# Patient Record
Sex: MALE | Race: WHITE | URBAN - METROPOLITAN AREA
[De-identification: names, ages, dates, MRNs, and addresses within clinical notes are randomized per-mention and may not be internally consistent; named-entity substitution may affect disease eponyms.]

---

## 2019-04-01 ENCOUNTER — OFFICE VISIT (OUTPATIENT)
Dept: UROLOGY | Age: 29
End: 2019-04-01
Payer: COMMERCIAL

## 2019-04-01 VITALS
SYSTOLIC BLOOD PRESSURE: 136 MMHG | HEIGHT: 70 IN | DIASTOLIC BLOOD PRESSURE: 88 MMHG | TEMPERATURE: 98.4 F | WEIGHT: 274 LBS | BODY MASS INDEX: 39.22 KG/M2

## 2019-04-01 DIAGNOSIS — R68.82 DECREASED LIBIDO: ICD-10-CM

## 2019-04-01 DIAGNOSIS — R39.89 BLADDER PAIN: ICD-10-CM

## 2019-04-01 DIAGNOSIS — N50.819 TESTICULAR PAIN: Primary | ICD-10-CM

## 2019-04-01 PROCEDURE — 99204 OFFICE O/P NEW MOD 45 MIN: CPT | Performed by: UROLOGY

## 2019-04-01 SDOH — HEALTH STABILITY: MENTAL HEALTH: HOW OFTEN DO YOU HAVE A DRINK CONTAINING ALCOHOL?: 2-3 TIMES A WEEK

## 2019-04-01 SDOH — HEALTH STABILITY: MENTAL HEALTH: HOW MANY STANDARD DRINKS CONTAINING ALCOHOL DO YOU HAVE ON A TYPICAL DAY?: 1 OR 2

## 2019-04-01 ASSESSMENT — ENCOUNTER SYMPTOMS
BACK PAIN: 0
VOMITING: 0
ABDOMINAL PAIN: 0
WHEEZING: 0
COLOR CHANGE: 0
EYE REDNESS: 0
SHORTNESS OF BREATH: 0
NAUSEA: 0
EYE PAIN: 0
COUGH: 0

## 2019-04-01 NOTE — PROGRESS NOTES
[facility]  [facility] [place]  [address]  [address]  [address] [facility] [address]  Dept: [phone]  Dept Fax: [phone]    [facility] Urology Office Note - New patient    Patient:  Keely Hastings  YOB: 1990  Date: 4/1/2019    The patient is a 25 y.o. male who presents todayfor evaluation of the following problems:   Chief Complaint   Patient presents with    New Patient     testicle pain , sensation of pressure in the bladder after urination , decreased libido , culture and us in care everywhere     referred by EILEEN Sin CNP. HPI  Pt has pelvic pressure; persists after he urinates. Has mild right testicular pain. startyed a few months ago. Has remote history of hydrocelectomy as young kid. Had u/s which was normal.      Has decreased libido. Has mild ED. Libido improving. Has to work harder to achieve erections but is able maintain. Able to penetrate and ejaculate normally. (Patient's old records have been requested, reviewed and summarized in today's note.)    Summary of old records: N/A    Additional History: N/A    Procedures Today: N/A    Last several PSA's:  No results found for: PSA  Last total testosterone:  No results found for: TESTOSTERONE  Urinalysis today:  No results found for this visit on 04/01/19.     AUA Symptom Score (4/1/2019):  INCOMPLETE EMPTYING: How often have you had the sensation of not emptying your bladder?: Not at all  FREQUENCY: How often do you have to urinate less than every two hours?: Not at all  INTERMITTENCY: How often have you found you stopped and started again several times when you urinated?: Not at all  URGENCY: How often have you found it difficult to postpone urination?: Less than 1 to 5 times  WEAK STREAM: How often have you had a weak urinary stream?: Not at all  STRAINING: How often have you had to strain to start  urination?: Not at all  NOCTURIA: How many times did you typically get up at not distended. Musculoskeletal: Normal gait and station  Penis normal and circumcised  Urethral meatus normal  Scrotal exam normal  Testicles normal bilaterally  Epididymis normal bilaterally        Assessment and Plan      1. Testicular pain    2. Bladder pain    3. Decreased libido           Plan:  Refer to Maryhelen Hammans for pelvic pain and pelvic floor dysfunction  F/u 3 mo       Prescriptions Ordered:  No orders of the defined types were placed in this encounter. Orders Placed:  No orders of the defined types were placed in this encounter. Marcus Espinoza MD    Agree with the ROS entered by the MA.